# Patient Record
Sex: FEMALE | Race: BLACK OR AFRICAN AMERICAN | NOT HISPANIC OR LATINO | Employment: FULL TIME | URBAN - METROPOLITAN AREA
[De-identification: names, ages, dates, MRNs, and addresses within clinical notes are randomized per-mention and may not be internally consistent; named-entity substitution may affect disease eponyms.]

---

## 2024-06-05 ENCOUNTER — OFFICE VISIT (OUTPATIENT)
Dept: URGENT CARE | Facility: CLINIC | Age: 59
End: 2024-06-05
Payer: COMMERCIAL

## 2024-06-05 VITALS
WEIGHT: 235 LBS | OXYGEN SATURATION: 96 % | SYSTOLIC BLOOD PRESSURE: 125 MMHG | DIASTOLIC BLOOD PRESSURE: 82 MMHG | HEART RATE: 76 BPM | RESPIRATION RATE: 20 BRPM | TEMPERATURE: 98.3 F | BODY MASS INDEX: 39.11 KG/M2

## 2024-06-05 DIAGNOSIS — S05.02XA ABRASION OF LEFT CORNEA, INITIAL ENCOUNTER: Primary | ICD-10-CM

## 2024-06-05 PROCEDURE — 99203 OFFICE O/P NEW LOW 30 MIN: CPT | Performed by: PHYSICIAN ASSISTANT

## 2024-06-05 RX ORDER — ERYTHROMYCIN 5 MG/G
OINTMENT OPHTHALMIC 3 TIMES DAILY
Qty: 3.5 G | Refills: 0 | Status: SHIPPED | OUTPATIENT
Start: 2024-06-05 | End: 2024-06-15

## 2024-06-05 ASSESSMENT — VISUAL ACUITY
OD_CC: 20/40
OS_CC: 20/70
OU: 1

## 2024-06-05 ASSESSMENT — PAIN SCALES - GENERAL: PAINLEVEL: 4

## 2024-06-05 NOTE — PROGRESS NOTES
Subjective   Patient ID: Bernice Cooks is a 58 y.o. female who presents for Foreign Body in Eye.  Patient is here with concerns of possible foreign body in the left eye.  She notes that she was walking into her job today and felt a piece of debris or insect fly into her left eye.  Had sudden onset of pain.  She does note that she flushed the eye but still feels like there is something stuck inside of her eye.  She reports left eye discharge and left-sided runny nose otherwise no fevers or chills no chest pain or shortness of breath no nausea vomiting diarrhea or abdominal pain.  She is endorsing blurry vision from the left eye    Past Medical History:   Diagnosis Date    Abnormal levels of other serum enzymes 12/13/2019    Elevated alkaline phosphatase level    Personal history of diseases of the blood and blood-forming organs and certain disorders involving the immune mechanism 12/13/2019    History of anemia         The remainder of the systems were reviewed and are negative unless noted above      Objective   /82   Pulse 76   Temp 36.8 °C (98.3 °F)   Resp 20   Wt 107 kg (235 lb)   SpO2 96%   BMI 39.11 kg/m²   Physical Exam  Constitutional:       Appearance: Normal appearance.   HENT:      Head: Normocephalic and atraumatic.   Eyes:      General: Lids are normal. Lids are everted, no foreign bodies appreciated. Vision grossly intact.      Conjunctiva/sclera:      Right eye: Right conjunctiva is not injected.      Left eye: Left conjunctiva is injected.      Pupils: Pupils are equal, round, and reactive to light.      Comments: Left eye is examined with fluorescein under Woods lamp with oval-shaped corneal abrasion noted to the  1 o'clock position   Cardiovascular:      Rate and Rhythm: Normal rate and regular rhythm.   Pulmonary:      Effort: Pulmonary effort is normal.      Breath sounds: Normal breath sounds.   Neurological:      Mental Status: She is alert.         Assessment/Plan   Problem List  Items Addressed This Visit       Left corneal abrasion - Primary    Relevant Medications    erythromycin (Romycin) 5 mg/gram (0.5 %) ophthalmic ointment   Corneal abrasion noted to the left eye on Woods light examination.  Mild conjunctival injection, clear discharge from the left eye.  PERRLA bilaterally.  Lids are examined for foreign bodies no foreign bodies appreciated on inspection.    Treating with erythromycin ointment and recommending follow-up with ophthalmology next week

## 2024-06-05 NOTE — PATIENT INSTRUCTIONS
Assessment/Plan   Problem List Items Addressed This Visit       Left corneal abrasion - Primary    Relevant Medications    erythromycin (Romycin) 5 mg/gram (0.5 %) ophthalmic ointment   Corneal abrasion noted to the left eye on Woods light examination.  Mild conjunctival injection, clear discharge from the left eye.  PERRLA bilaterally.  Lids are examined for foreign bodies no foreign bodies appreciated on inspection.    Treating with erythromycin ointment and recommending follow-up with ophthalmology next week

## 2024-07-10 ENCOUNTER — APPOINTMENT (OUTPATIENT)
Dept: ENDOCRINOLOGY | Facility: CLINIC | Age: 59
End: 2024-07-10
Payer: COMMERCIAL

## 2024-10-30 ENCOUNTER — APPOINTMENT (OUTPATIENT)
Dept: ENDOCRINOLOGY | Facility: CLINIC | Age: 59
End: 2024-10-30
Payer: COMMERCIAL

## 2025-01-02 ENCOUNTER — APPOINTMENT (OUTPATIENT)
Dept: ENDOCRINOLOGY | Facility: CLINIC | Age: 60
End: 2025-01-02
Payer: COMMERCIAL

## 2025-01-07 ENCOUNTER — APPOINTMENT (OUTPATIENT)
Dept: ENDOCRINOLOGY | Facility: CLINIC | Age: 60
End: 2025-01-07
Payer: COMMERCIAL

## 2025-01-15 ENCOUNTER — APPOINTMENT (OUTPATIENT)
Dept: ENDOCRINOLOGY | Facility: CLINIC | Age: 60
End: 2025-01-15
Payer: COMMERCIAL

## 2025-01-15 VITALS
DIASTOLIC BLOOD PRESSURE: 74 MMHG | HEIGHT: 67 IN | SYSTOLIC BLOOD PRESSURE: 124 MMHG | WEIGHT: 243 LBS | BODY MASS INDEX: 38.14 KG/M2

## 2025-01-15 DIAGNOSIS — E04.2 MULTINODULAR GOITER: Primary | ICD-10-CM

## 2025-01-15 DIAGNOSIS — E04.1 THYROID NODULE: ICD-10-CM

## 2025-01-15 PROCEDURE — 3008F BODY MASS INDEX DOCD: CPT | Performed by: STUDENT IN AN ORGANIZED HEALTH CARE EDUCATION/TRAINING PROGRAM

## 2025-01-15 PROCEDURE — 99214 OFFICE O/P EST MOD 30 MIN: CPT | Performed by: STUDENT IN AN ORGANIZED HEALTH CARE EDUCATION/TRAINING PROGRAM

## 2025-01-15 NOTE — PROGRESS NOTES
"Subjective   Patient ID: Bernice Cooks is a 59 y.o. female who presents for Thyroid Problem (Last seen 8/1/23 , also last last thyroid ultrasound done 8/1/23/no current Thyroid medication).  HPI  The pt is a 59 yr old female who presents for [facility] follow up.      She is doing overall well  She denies compressive symptoms , SOB , hoarseness of voice ,or difficulty swallowing.  sleeps well ,energy level is good.    Thyroid US 8/3/23  Right thyroid lobe measured 55 x 23 x 32 mm, previously measuring 50   x 22 x 24 mm.   Heterogeneous echogenicity throughout. Several   scattered subcentimeter nodules. The largest is a complex cystic   nodule measuring 7 x 4 x 7 mm. Previously, there was a subtle   hypoechoic solid-appearing nodule posteriorly in the lower pole that   is not reproduced today.      Thyroid isthmus measured  6 mm in AP thickness.      Left thyroid lobe measured 53 x 18 x 24 mm, previously measuring 49 x   18 x 25 mm.    Heterogeneous echogenicity throughout with several   scattered tiny hypoechoic nodules. No suspicious solid or cystic   lesion.     Review of Systems    Objective   Physical Exam  Constitutional:       Appearance: Normal appearance.   Neck:      Thyroid: Thyromegaly present.   Cardiovascular:      Rate and Rhythm: Normal rate and regular rhythm.   Pulmonary:      Effort: Pulmonary effort is normal.      Breath sounds: Normal breath sounds.   Neurological:      General: No focal deficit present.      Mental Status: She is alert.      Visit Vitals  /74   Ht 1.702 m (5' 7\")   Wt 110 kg (243 lb)   BMI 38.06 kg/m²   BSA 2.28 m²        Assessment/Plan         - Non compressive Goiter with Multiple small subcentimeter thyroid nodules , largest nodule is 7 mm complex nodule. Repeat US ordered for interval change   Clinically and biochemically euthyroid not on LT4 supplements , last TSH in 2022 wnl. She has labs at OSH she will repeat TSH if not able to find results      further recommendations " pending above

## 2025-01-16 ENCOUNTER — HOSPITAL ENCOUNTER (OUTPATIENT)
Dept: RADIOLOGY | Facility: HOSPITAL | Age: 60
Discharge: HOME | End: 2025-01-16
Payer: COMMERCIAL

## 2025-01-16 ENCOUNTER — LAB (OUTPATIENT)
Dept: LAB | Facility: LAB | Age: 60
End: 2025-01-16
Payer: COMMERCIAL

## 2025-01-16 DIAGNOSIS — E04.1 THYROID NODULE: ICD-10-CM

## 2025-01-16 LAB — TSH SERPL-ACNC: 0.93 MIU/L (ref 0.44–3.98)

## 2025-01-16 PROCEDURE — 84443 ASSAY THYROID STIM HORMONE: CPT

## 2025-01-16 PROCEDURE — 76536 US EXAM OF HEAD AND NECK: CPT | Performed by: STUDENT IN AN ORGANIZED HEALTH CARE EDUCATION/TRAINING PROGRAM

## 2025-01-16 PROCEDURE — 76536 US EXAM OF HEAD AND NECK: CPT

## 2025-08-27 ENCOUNTER — HOSPITAL ENCOUNTER (OUTPATIENT)
Dept: RADIOLOGY | Facility: CLINIC | Age: 60
Discharge: HOME | End: 2025-08-27
Payer: COMMERCIAL

## 2025-08-27 VITALS — WEIGHT: 242.51 LBS | HEIGHT: 67 IN | BODY MASS INDEX: 38.06 KG/M2

## 2025-08-27 DIAGNOSIS — Z12.31 ENCOUNTER FOR SCREENING MAMMOGRAM FOR MALIGNANT NEOPLASM OF BREAST: ICD-10-CM

## 2025-08-27 PROCEDURE — 77063 BREAST TOMOSYNTHESIS BI: CPT | Performed by: RADIOLOGY

## 2025-08-27 PROCEDURE — 77067 SCR MAMMO BI INCL CAD: CPT

## 2025-08-27 PROCEDURE — 77067 SCR MAMMO BI INCL CAD: CPT | Performed by: RADIOLOGY

## 2025-08-28 ENCOUNTER — APPOINTMENT (OUTPATIENT)
Dept: RADIOLOGY | Facility: CLINIC | Age: 60
End: 2025-08-28
Payer: COMMERCIAL